# Patient Record
Sex: MALE | Race: WHITE | NOT HISPANIC OR LATINO | Employment: UNEMPLOYED | URBAN - METROPOLITAN AREA
[De-identification: names, ages, dates, MRNs, and addresses within clinical notes are randomized per-mention and may not be internally consistent; named-entity substitution may affect disease eponyms.]

---

## 2017-11-30 ENCOUNTER — TELEPHONE (OUTPATIENT)
Dept: AUDIOLOGY | Facility: CLINIC | Age: 57
End: 2017-11-30

## 2017-11-30 NOTE — TELEPHONE ENCOUNTER
Message left for Alberta to fax over last audiogram to see if patient is CI candidate. If he is we will call to schedule. ----- Message from Akua Koenig sent at 11/27/2017  3:15 PM CST -----  Contact: Alberta Denis office   Alberta is calling to speak with the nurse pt needs to be scheduled for a consult appt for Cochlear implants can you please call Alberta at  [phone]     GIL

## 2017-12-20 ENCOUNTER — PATIENT MESSAGE (OUTPATIENT)
Dept: OTOLARYNGOLOGY | Facility: CLINIC | Age: 57
End: 2017-12-20

## 2018-01-22 ENCOUNTER — CLINICAL SUPPORT (OUTPATIENT)
Dept: AUDIOLOGY | Facility: CLINIC | Age: 58
End: 2018-01-22
Payer: COMMERCIAL

## 2018-01-22 ENCOUNTER — CLINICAL SUPPORT (OUTPATIENT)
Dept: INFECTIOUS DISEASES | Facility: CLINIC | Age: 58
End: 2018-01-22
Payer: COMMERCIAL

## 2018-01-22 ENCOUNTER — TELEPHONE (OUTPATIENT)
Dept: OTOLARYNGOLOGY | Facility: CLINIC | Age: 58
End: 2018-01-22

## 2018-01-22 ENCOUNTER — OFFICE VISIT (OUTPATIENT)
Dept: OTOLARYNGOLOGY | Facility: CLINIC | Age: 58
End: 2018-01-22
Payer: COMMERCIAL

## 2018-01-22 VITALS — BODY MASS INDEX: 35.3 KG/M2 | WEIGHT: 211.88 LBS | HEIGHT: 65 IN

## 2018-01-22 DIAGNOSIS — H90.3 SENSORINEURAL HEARING LOSS (SNHL) OF BOTH EARS: Primary | ICD-10-CM

## 2018-01-22 DIAGNOSIS — Z23 NEED FOR VACCINATION: Primary | ICD-10-CM

## 2018-01-22 DIAGNOSIS — H90.3 SENSORINEURAL HEARING LOSS, BILATERAL: Primary | ICD-10-CM

## 2018-01-22 DIAGNOSIS — Z97.4 DOES USE HEARING AID: ICD-10-CM

## 2018-01-22 PROCEDURE — 90670 PCV13 VACCINE IM: CPT | Mod: S$GLB,,, | Performed by: INTERNAL MEDICINE

## 2018-01-22 PROCEDURE — 99499 UNLISTED E&M SERVICE: CPT | Mod: S$GLB,,, | Performed by: OTOLARYNGOLOGY

## 2018-01-22 PROCEDURE — 99999 PR PBB SHADOW E&M-EST. PATIENT-LVL I: CPT | Mod: PBBFAC,,, | Performed by: AUDIOLOGIST

## 2018-01-22 PROCEDURE — 90471 IMMUNIZATION ADMIN: CPT | Mod: S$GLB,,, | Performed by: INTERNAL MEDICINE

## 2018-01-22 PROCEDURE — 92626 EVAL AUD FUNCJ 1ST HOUR: CPT | Mod: S$GLB,,, | Performed by: AUDIOLOGIST

## 2018-01-22 PROCEDURE — 99204 OFFICE O/P NEW MOD 45 MIN: CPT | Mod: S$GLB,,, | Performed by: OTOLARYNGOLOGY

## 2018-01-22 PROCEDURE — 99999 PR PBB SHADOW E&M-EST. PATIENT-LVL II: CPT | Mod: PBBFAC,,, | Performed by: OTOLARYNGOLOGY

## 2018-01-22 RX ORDER — DULAGLUTIDE 1.5 MG/.5ML
INJECTION, SOLUTION SUBCUTANEOUS
COMMUNITY
Start: 2018-01-13 | End: 2021-07-21

## 2018-01-22 RX ORDER — FLUOXETINE HYDROCHLORIDE 40 MG/1
CAPSULE ORAL
COMMUNITY
Start: 2018-01-03 | End: 2021-07-21

## 2018-01-22 RX ORDER — FINASTERIDE 5 MG/1
5 TABLET, FILM COATED ORAL DAILY
COMMUNITY
Start: 2018-01-03

## 2018-01-22 RX ORDER — PANTOPRAZOLE SODIUM 40 MG/1
TABLET, DELAYED RELEASE ORAL
COMMUNITY
Start: 2018-01-03 | End: 2021-07-21

## 2018-01-22 RX ORDER — LISINOPRIL 20 MG/1
TABLET ORAL
COMMUNITY
Start: 2018-01-03

## 2018-01-22 RX ORDER — ATORVASTATIN CALCIUM 40 MG/1
TABLET, FILM COATED ORAL
COMMUNITY
Start: 2018-01-03 | End: 2021-07-21 | Stop reason: DRUGHIGH

## 2018-01-22 RX ORDER — LIRAGLUTIDE 6 MG/ML
INJECTION SUBCUTANEOUS
Refills: 3 | Status: ON HOLD | COMMUNITY
Start: 2017-10-16 | End: 2018-04-17

## 2018-01-22 NOTE — PROGRESS NOTES
Subjective:       Patient ID: Geoffrey Lama is a 57 y.o. male.    Chief Complaint: No chief complaint on file.    HPI   57 M here for CI eval.  Has h/o hearing loss x 25 yrs AU, symmetric> (R>L?). 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 He has been using HA's for 2-3 years with little relief.     Has severe to profound SNHL AU on audio.    He currently denies any otologic stx including otalgia, otorrhea, vertigo, facial nn weakness.    No h/o ear infections, ear surgery, head trauma.  No FH hearing loss (except grandma at older age), vertigo, migraines.    No imaging to review.    Review of Systems    Review of Systems reviewed including    CONSTITUTIONAL: no fevers, chills, weight loss, night sweats  EYES: no diplopia, no blurry vision  ENT: as above   NEURO: no motor or sensory deficits  CV: no CP, no palpitations  PULM: no cough, no SOB, no wheezing   GI: no abd pain, no constipation/diarrhea  : no dysuria, no hematuria  MSK: no bone/joint pain  HEM: no bruising or bleeding   PSYCH - no depression, no anxiety    History reviewed. No pertinent past medical history.      Current Outpatient Prescriptions:     atorvastatin (LIPITOR) 40 MG tablet, , Disp: , Rfl:     finasteride (PROSCAR) 5 mg tablet, , Disp: , Rfl:     FLUoxetine (PROZAC) 40 MG capsule, , Disp: , Rfl:     lisinopril (PRINIVIL,ZESTRIL) 20 MG tablet, , Disp: , Rfl:     pantoprazole (PROTONIX) 40 MG tablet, , Disp: , Rfl:     TRULICITY 1.5 mg/0.5 mL PnIj, , Disp: , Rfl:     VICTOZA 3-MARGO 0.6 mg/0.1 mL (18 mg/3 mL) PnIj, INJECT 1.8 MG SUBQ EVERY MORNING, Disp: , Rfl: 3    History reviewed. No pertinent surgical history.    Social History   Substance Use Topics    Smoking status: Never Smoker    Smokeless tobacco: Not on file    Alcohol use Not on file       History reviewed. No pertinent family history.    Review of patient's allergies indicates:  No Known Allergies      Objective:      Physical Exam     General: NAD; Well appearing  Neuro: AAOx3. CN II-XII intact.  Cardiovascular: normal rate  Respiratory: No labored breathing, no stridor  Head/Face: Normal inspection  Eyes: EOMI; PERRLA     Ears examined   Right Ear: Auricle WNL. EAC WNL. TM normal.      Left Ear: Auricle WNL. EAC WNL. TM normal.    Nose: normal ext appearance and palpation.  OC/OP: wnl  Neck/Lymphatic: No LAD.              NO IMAGING TO REVIEW    Assessment:       1. severe-profound SNHL AU    2. Does use hearing aid        Plan:       Discussed r/b/a to cochlear implant. Pt would like to proceed.    Patient to have CI evaluation and consideration for right CI. Appropriate imaging, medical and financial clearances to be done. Risks, complications, alternatives and goals discussed and reviewed. Including: infection, bleeding, failure of device, extrusion, dizziness, facial nerve problems and other potential issues.    There is no middle ear infection, the cochlear lumen is suited to implantation and there are no lesions of the auditory nerves or brain.    There are no contraindications to surgery.

## 2018-01-22 NOTE — LETTER
January 22, 2018      Sandrine Guerrero, [facility]  [address]  [facility] [address]           [facility] - Otorhinolaryngology  [address]  [facility] [address]  Phone: [phone]  Fax: [phone]          Patient: Geoffrey Lama   MR Number: [id number]   YOB: 1960   Date of Visit: 1/22/2018       Dear Sandrine Guerrero:    Thank you for referring Geoffrey Lama to me for evaluation. Attached you will find relevant portions of my assessment and plan of care.    If you have questions, please do not hesitate to call me. I look forward to following Geoffrey Lama along with you.    Sincerely,    Brennon Og MD    Enclosure  CC:  No Recipients    If you would like to receive this communication electronically, please contact [email or web address] or [phone] to request more information on [facility] access.    For providers and/or their staff who would like to refer a patient to [facility], please contact us through our one-stop-shop provider referral line, [facility], at [phone].    If you feel you have received this communication in error or would no longer like to receive these types of communications, please e-mail [email or web address]

## 2018-01-22 NOTE — PROGRESS NOTES
COCHLEAR IMPLANT EVALUATION  Geoffrey Lama, 57 y.o., was referred to our cochlear implant team by Dr. Chaparro Og on 1/22/2018 for a formal cochlear implant evaluation.      HISTORY:     reported a long and progressive history of hearing loss in both ears .  The etiology of hearing loss is most likely noise induced.  He has been wearing hearing aids for 25 years.    currently wears Yoanna  in the ear (RITE) hearing aids with custom molds.    AUDIOLOGICAL EVALUATION:  The results of the audiological evaluation indicated a severe to profound sensorineural hearing loss (SNHL) in both ears.  Speech reception thresholds (SRT) were obtained at 70dBHL for both ears.  Speech discrimination scores were 52% for the right ear and 64% for the left ear.     Aided testing was completed in the best aided condition. Bilateral aided results were obtained in the +dBHL range.  Bilateral aided SRT was obtained at 4dBHL.  Aided speech discrimination scores in the soundfield was 72% and 60% for each ear independently.  Mr. Lama scored 11% on the [employer] sentence test in the best aided condition, that is both aids in quiet.  He scored 3% in noise.  He scored 0% in quiet for his left ear and 9% for his right ear.  He scored 3% for CNC words with the right aid.    Tympanometry revealed normal middle ear functioning bilaterally. Acoustic reflexes were absent in all conditions.        RECOMMENDATIONS:  Based on the results of this evaluation,  would benefit from cochlear implantation from an audiological standpoint.  Appropriate expectations, along with the benefits and limitations of the cochlear implant were discussed with  and their family.   acknowledged understanding and indicated he would like to proceed with right cochlear implantation, a Cochlear 512 device, and a gray N7 sound processor.      Katie Mcdowell, CCC-A  Audiologist

## 2018-01-24 ENCOUNTER — TELEPHONE (OUTPATIENT)
Dept: OTOLARYNGOLOGY | Facility: CLINIC | Age: 58
End: 2018-01-24

## 2018-03-16 ENCOUNTER — PATIENT MESSAGE (OUTPATIENT)
Dept: AUDIOLOGY | Facility: CLINIC | Age: 58
End: 2018-03-16

## 2018-03-19 ENCOUNTER — CLINICAL SUPPORT (OUTPATIENT)
Dept: INFECTIOUS DISEASES | Facility: CLINIC | Age: 58
End: 2018-03-19
Payer: COMMERCIAL

## 2018-03-19 ENCOUNTER — HOSPITAL ENCOUNTER (OUTPATIENT)
Dept: RADIOLOGY | Facility: HOSPITAL | Age: 58
Discharge: HOME | End: 2018-03-19
Payer: COMMERCIAL

## 2018-03-19 DIAGNOSIS — H90.3 SENSORINEURAL HEARING LOSS (SNHL) OF BOTH EARS: ICD-10-CM

## 2018-03-19 PROCEDURE — 90732 PPSV23 VACC 2 YRS+ SUBQ/IM: CPT | Mod: S$GLB,,, | Performed by: INTERNAL MEDICINE

## 2018-03-19 PROCEDURE — 90471 IMMUNIZATION ADMIN: CPT | Mod: S$GLB,,, | Performed by: INTERNAL MEDICINE

## 2018-03-19 PROCEDURE — 70480 CT ORBIT/EAR/FOSSA W/O DYE: CPT | Mod: 26,,, | Performed by: RADIOLOGY

## 2018-03-19 PROCEDURE — 70480 CT ORBIT/EAR/FOSSA W/O DYE: CPT | Mod: TC

## 2018-03-23 ENCOUNTER — PATIENT MESSAGE (OUTPATIENT)
Dept: OTOLARYNGOLOGY | Facility: CLINIC | Age: 58
End: 2018-03-23

## 2018-03-23 ENCOUNTER — TELEPHONE (OUTPATIENT)
Dept: OTOLARYNGOLOGY | Facility: CLINIC | Age: 58
End: 2018-03-23

## 2018-03-23 DIAGNOSIS — H90.3 SENSORINEURAL HEARING LOSS (SNHL) OF BOTH EARS: Primary | ICD-10-CM

## 2018-03-23 DIAGNOSIS — Z97.4 DOES USE HEARING AID: ICD-10-CM

## 2018-04-13 ENCOUNTER — PATIENT MESSAGE (OUTPATIENT)
Dept: SURGERY | Facility: HOSPITAL | Age: 58
End: 2018-04-13

## 2018-04-16 ENCOUNTER — TELEPHONE (OUTPATIENT)
Dept: OTOLARYNGOLOGY | Facility: CLINIC | Age: 58
End: 2018-04-16

## 2018-04-17 ENCOUNTER — ANESTHESIA EVENT (OUTPATIENT)
Dept: SURGERY | Facility: HOSPITAL | Age: 58
End: 2018-04-17
Payer: COMMERCIAL

## 2018-04-17 ENCOUNTER — ANESTHESIA (OUTPATIENT)
Dept: SURGERY | Facility: HOSPITAL | Age: 58
End: 2018-04-17
Payer: COMMERCIAL

## 2018-04-17 ENCOUNTER — HOSPITAL ENCOUNTER (OUTPATIENT)
Facility: HOSPITAL | Age: 58
Discharge: HOME | End: 2018-04-17
Attending: OTOLARYNGOLOGY | Admitting: OTOLARYNGOLOGY
Payer: COMMERCIAL

## 2018-04-17 ENCOUNTER — SURGERY (OUTPATIENT)
Age: 58
End: 2018-04-17

## 2018-04-17 DIAGNOSIS — Z01.810 PREOP CARDIOVASCULAR EXAM: ICD-10-CM

## 2018-04-17 DIAGNOSIS — H90.3 SENSORINEURAL HEARING LOSS (SNHL) OF BOTH EARS: ICD-10-CM

## 2018-04-17 DIAGNOSIS — H91.90 HEARING LOSS: Primary | ICD-10-CM

## 2018-04-17 LAB
ANION GAP SERPL CALC-SCNC: 12 MMOL/L
BUN SERPL-MCNC: 15 MG/DL
CALCIUM SERPL-MCNC: 9.5 MG/DL
CHLORIDE SERPL-SCNC: 99 MMOL/L
CO2 SERPL-SCNC: 23 MMOL/L
CREAT SERPL-MCNC: 1 MG/DL
EST. GFR  (AFRICAN AMERICAN): >60 ML/MIN/1.73 M^2
EST. GFR  (NON AFRICAN AMERICAN): >60 ML/MIN/1.73 M^2
GLUCOSE SERPL-MCNC: 224 MG/DL
POCT GLUCOSE: 142 MG/DL (ref 70–110)
POCT GLUCOSE: 230 MG/DL (ref 70–110)
POTASSIUM SERPL-SCNC: 5 MMOL/L
SODIUM SERPL-SCNC: 134 MMOL/L

## 2018-04-17 PROCEDURE — 25000003 PHARM REV CODE 250: Performed by: NURSE ANESTHETIST, CERTIFIED REGISTERED

## 2018-04-17 PROCEDURE — 36000710: Performed by: OTOLARYNGOLOGY

## 2018-04-17 PROCEDURE — 94761 N-INVAS EAR/PLS OXIMETRY MLT: CPT

## 2018-04-17 PROCEDURE — 36000711: Performed by: OTOLARYNGOLOGY

## 2018-04-17 PROCEDURE — 71000015 HC POSTOP RECOV 1ST HR: Performed by: OTOLARYNGOLOGY

## 2018-04-17 PROCEDURE — L8614 COCHLEAR DEVICE: HCPCS | Performed by: OTOLARYNGOLOGY

## 2018-04-17 PROCEDURE — 93010 ELECTROCARDIOGRAM REPORT: CPT | Mod: ,,, | Performed by: INTERNAL MEDICINE

## 2018-04-17 PROCEDURE — D9220A PRA ANESTHESIA: Mod: CRNA,,, | Performed by: NURSE ANESTHETIST, CERTIFIED REGISTERED

## 2018-04-17 PROCEDURE — 69930 IMPLANT COCHLEAR DEVICE: CPT | Mod: RT,,, | Performed by: OTOLARYNGOLOGY

## 2018-04-17 PROCEDURE — 82962 GLUCOSE BLOOD TEST: CPT | Performed by: OTOLARYNGOLOGY

## 2018-04-17 PROCEDURE — 25000003 PHARM REV CODE 250: Performed by: OTOLARYNGOLOGY

## 2018-04-17 PROCEDURE — 37000008 HC ANESTHESIA 1ST 15 MINUTES: Performed by: OTOLARYNGOLOGY

## 2018-04-17 PROCEDURE — 93005 ELECTROCARDIOGRAM TRACING: CPT

## 2018-04-17 PROCEDURE — 63600175 PHARM REV CODE 636 W HCPCS: Performed by: NURSE ANESTHETIST, CERTIFIED REGISTERED

## 2018-04-17 PROCEDURE — 27201423 OPTIME MED/SURG SUP & DEVICES STERILE SUPPLY: Performed by: OTOLARYNGOLOGY

## 2018-04-17 PROCEDURE — 37000009 HC ANESTHESIA EA ADD 15 MINS: Performed by: OTOLARYNGOLOGY

## 2018-04-17 PROCEDURE — 71000033 HC RECOVERY, INTIAL HOUR: Performed by: OTOLARYNGOLOGY

## 2018-04-17 PROCEDURE — 80048 BASIC METABOLIC PNL TOTAL CA: CPT

## 2018-04-17 PROCEDURE — 63600175 PHARM REV CODE 636 W HCPCS: Mod: JG | Performed by: OTOLARYNGOLOGY

## 2018-04-17 PROCEDURE — D9220A PRA ANESTHESIA: Mod: ANES,,, | Performed by: ANESTHESIOLOGY

## 2018-04-17 PROCEDURE — 27000221 HC OXYGEN, UP TO 24 HOURS

## 2018-04-17 DEVICE — IMPLANTABLE DEVICE: Type: IMPLANTABLE DEVICE | Site: EAR | Status: FUNCTIONAL

## 2018-04-17 RX ORDER — METHYLENE BLUE 10 MG/ML
INJECTION INTRAVENOUS
Status: DISCONTINUED | OUTPATIENT
Start: 2018-04-17 | End: 2018-04-17 | Stop reason: HOSPADM

## 2018-04-17 RX ORDER — FENTANYL CITRATE 50 UG/ML
INJECTION, SOLUTION INTRAMUSCULAR; INTRAVENOUS
Status: DISCONTINUED | OUTPATIENT
Start: 2018-04-17 | End: 2018-04-17

## 2018-04-17 RX ORDER — PHENYLEPHRINE HYDROCHLORIDE 10 MG/ML
INJECTION INTRAVENOUS
Status: DISCONTINUED | OUTPATIENT
Start: 2018-04-17 | End: 2018-04-17

## 2018-04-17 RX ORDER — OXYCODONE AND ACETAMINOPHEN 5; 325 MG/1; MG/1
1 TABLET ORAL EVERY 6 HOURS PRN
Qty: 28 TABLET | Refills: 0 | Status: SHIPPED | OUTPATIENT
Start: 2018-04-17 | End: 2021-07-21

## 2018-04-17 RX ORDER — CEPHALEXIN 500 MG/1
500 CAPSULE ORAL EVERY 8 HOURS
Qty: 30 CAPSULE | Refills: 0 | Status: SHIPPED | OUTPATIENT
Start: 2018-04-17 | End: 2018-04-27

## 2018-04-17 RX ORDER — ONDANSETRON 4 MG/1
4 TABLET, ORALLY DISINTEGRATING ORAL EVERY 8 HOURS PRN
Qty: 15 TABLET | Refills: 0 | Status: SHIPPED | OUTPATIENT
Start: 2018-04-17 | End: 2021-07-21

## 2018-04-17 RX ORDER — SODIUM CHLORIDE 9 MG/ML
INJECTION, SOLUTION INTRAVENOUS CONTINUOUS PRN
Status: DISCONTINUED | OUTPATIENT
Start: 2018-04-17 | End: 2018-04-17

## 2018-04-17 RX ORDER — ONDANSETRON 2 MG/ML
INJECTION INTRAMUSCULAR; INTRAVENOUS
Status: DISCONTINUED | OUTPATIENT
Start: 2018-04-17 | End: 2018-04-17

## 2018-04-17 RX ORDER — ROCURONIUM BROMIDE 10 MG/ML
INJECTION, SOLUTION INTRAVENOUS
Status: DISCONTINUED | OUTPATIENT
Start: 2018-04-17 | End: 2018-04-17

## 2018-04-17 RX ORDER — SUCCINYLCHOLINE CHLORIDE 20 MG/ML
INJECTION INTRAMUSCULAR; INTRAVENOUS
Status: DISCONTINUED | OUTPATIENT
Start: 2018-04-17 | End: 2018-04-17

## 2018-04-17 RX ORDER — FENTANYL CITRATE 50 UG/ML
25 INJECTION, SOLUTION INTRAMUSCULAR; INTRAVENOUS EVERY 5 MIN PRN
Status: DISCONTINUED | OUTPATIENT
Start: 2018-04-17 | End: 2018-04-17 | Stop reason: HOSPADM

## 2018-04-17 RX ORDER — PROPOFOL 10 MG/ML
VIAL (ML) INTRAVENOUS
Status: DISCONTINUED | OUTPATIENT
Start: 2018-04-17 | End: 2018-04-17

## 2018-04-17 RX ORDER — MIDAZOLAM HYDROCHLORIDE 1 MG/ML
INJECTION INTRAMUSCULAR; INTRAVENOUS
Status: DISCONTINUED | OUTPATIENT
Start: 2018-04-17 | End: 2018-04-17

## 2018-04-17 RX ORDER — LIDOCAINE HCL/PF 100 MG/5ML
SYRINGE (ML) INTRAVENOUS
Status: DISCONTINUED | OUTPATIENT
Start: 2018-04-17 | End: 2018-04-17

## 2018-04-17 RX ORDER — SODIUM CHLORIDE 0.9 % (FLUSH) 0.9 %
3 SYRINGE (ML) INJECTION
Status: DISCONTINUED | OUTPATIENT
Start: 2018-04-17 | End: 2018-04-17 | Stop reason: HOSPADM

## 2018-04-17 RX ORDER — CEFAZOLIN SODIUM 1 G/3ML
INJECTION, POWDER, FOR SOLUTION INTRAMUSCULAR; INTRAVENOUS
Status: DISCONTINUED | OUTPATIENT
Start: 2018-04-17 | End: 2018-04-17

## 2018-04-17 RX ORDER — METFORMIN HYDROCHLORIDE 500 MG/1
1000 TABLET ORAL 2 TIMES DAILY WITH MEALS
COMMUNITY
End: 2021-07-21 | Stop reason: SDUPTHER

## 2018-04-17 RX ORDER — LIDOCAINE HYDROCHLORIDE AND EPINEPHRINE 15; 5 MG/ML; UG/ML
INJECTION, SOLUTION EPIDURAL
Status: DISCONTINUED | OUTPATIENT
Start: 2018-04-17 | End: 2018-04-17 | Stop reason: HOSPADM

## 2018-04-17 RX ORDER — OXYCODONE AND ACETAMINOPHEN 5; 325 MG/1; MG/1
1 TABLET ORAL EVERY 6 HOURS PRN
Status: DISCONTINUED | OUTPATIENT
Start: 2018-04-17 | End: 2018-04-17 | Stop reason: HOSPADM

## 2018-04-17 RX ADMIN — FENTANYL CITRATE 50 MCG: 50 INJECTION, SOLUTION INTRAMUSCULAR; INTRAVENOUS at 10:04

## 2018-04-17 RX ADMIN — LIDOCAINE HYDROCHLORIDE 100 MG: 20 INJECTION, SOLUTION INTRAVENOUS at 10:04

## 2018-04-17 RX ADMIN — SODIUM CHLORIDE: 0.9 INJECTION, SOLUTION INTRAVENOUS at 10:04

## 2018-04-17 RX ADMIN — PROPOFOL 200 MG: 10 INJECTION, EMULSION INTRAVENOUS at 10:04

## 2018-04-17 RX ADMIN — PHENYLEPHRINE HYDROCHLORIDE 0.25 MCG/KG/MIN: 10 INJECTION INTRAVENOUS at 11:04

## 2018-04-17 RX ADMIN — LIDOCAINE HYDROCHLORIDE AND EPINEPHRINE 10 ML: 15; 5 INJECTION, SOLUTION EPIDURAL at 01:04

## 2018-04-17 RX ADMIN — METHYLENE BLUE 0.5 MG: 10 INJECTION, SOLUTION INTRAVENOUS at 11:04

## 2018-04-17 RX ADMIN — MIDAZOLAM HYDROCHLORIDE 2 MG: 1 INJECTION, SOLUTION INTRAMUSCULAR; INTRAVENOUS at 10:04

## 2018-04-17 RX ADMIN — PHENYLEPHRINE HYDROCHLORIDE 200 MCG: 10 INJECTION INTRAVENOUS at 11:04

## 2018-04-17 RX ADMIN — ONDANSETRON 4 MG: 2 INJECTION INTRAMUSCULAR; INTRAVENOUS at 01:04

## 2018-04-17 RX ADMIN — ROCURONIUM BROMIDE 5 MG: 10 INJECTION, SOLUTION INTRAVENOUS at 10:04

## 2018-04-17 RX ADMIN — SUCCINYLCHOLINE CHLORIDE 140 MG: 20 INJECTION, SOLUTION INTRAMUSCULAR; INTRAVENOUS at 10:04

## 2018-04-17 RX ADMIN — FENTANYL CITRATE 100 MCG: 50 INJECTION, SOLUTION INTRAMUSCULAR; INTRAVENOUS at 10:04

## 2018-04-17 RX ADMIN — SODIUM CHLORIDE, SODIUM GLUCONATE, SODIUM ACETATE, POTASSIUM CHLORIDE, MAGNESIUM CHLORIDE, SODIUM PHOSPHATE, DIBASIC, AND POTASSIUM PHOSPHATE: .53; .5; .37; .037; .03; .012; .00082 INJECTION, SOLUTION INTRAVENOUS at 01:04

## 2018-04-17 RX ADMIN — OXYCODONE HYDROCHLORIDE AND ACETAMINOPHEN 1 TABLET: 5; 325 TABLET ORAL at 02:04

## 2018-04-17 RX ADMIN — CEFAZOLIN 2 G: 330 INJECTION, POWDER, FOR SOLUTION INTRAMUSCULAR; INTRAVENOUS at 11:04

## 2018-04-17 NOTE — TRANSFER OF CARE
"Anesthesia Transfer of Care Note    Patient: Geoffrey Lama    Procedure(s) Performed: Procedure(s) (LRB):  IMPLANTATION-COCHLEAR DEVICE (Right)    Patient location: PACU    Anesthesia Type: general    Transport from OR: Transported from OR on 6-10 L/min O2 by face mask with adequate spontaneous ventilation    Post pain: adequate analgesia    Post assessment: no apparent anesthetic complications    Post vital signs: stable    Level of consciousness: sedated    Nausea/Vomiting: no nausea/vomiting    Complications: none    Transfer of care protocol was followed      Last vitals:   Visit Vitals  /73 (BP Location: Left arm, Patient Position: Lying)   Pulse 97   Temp 36 °C (96.8 °F) (Temporal)   Resp 19   Ht 5' 5" (1.651 m)   Wt 94.3 kg (208 lb)   SpO2 98%   BMI 34.61 kg/m²     "

## 2018-04-17 NOTE — OP NOTE
Pre Op Dx:Sensorineural Hearing Loss    Post Op Dx: Same.    Operative Procedure: Nucleus 24 Channel 512 Cochlear Implantation with use of Operating Microscope and Facial Nerve Monitor. Right    Surgeon: Brennon Og M.D.    Assist:Jasvir    4/17/18    Estimated Blood Loss: 5 cc    Anesthesia: GET.    Operative Procedure in Detail:    The patient was brought to the operating room and placed in the supine position. After general endotracheal anesthesia was induced the patient was prepped and draped in there usual fashion for post auricular cochlear  implant surgery. Using the implant guides the front corner of the implant bed was marked on the lateral musculoperiosteum with an injection of methylene blue.  A C shaped incision was outlined in the postauricular area and infiltrated with 1% xylocaine with 1/100,000 epinephrine solution. The incision was made and carried down to the level of the temporalis muscle.  Hemostasis was obtained. A posteriorly directed subcutaneous flap was created to the level of the methelene blue jesica. Retractors were placed. An anterior based musculoperiosteal flap was incised and elevated to the level of the ear canal. Emissary veins were managed with bone wax and electrocautery. A posterior, superior subperiosteal flap was then elevated for the implant. Using the implant guides a trough for the implant electrodes were created in the lateral temporal bone at the site of the methylene blue jesica to a depth of 5 mm. A posterior, superior ramp was then drilled for the implant antennae. A complete simple mastoidectomy was next done and the facial recess opened using a 1.5 mm kerri blanche. The round window niche was identified.  Musculoperiosteum was harvested for later cochlear packing. Final hemostasis and irrigation was then done. A trough was created underneath the temporalis muscle for the ground electrode. Using micro suction, a 1 mm blanche and soft surgical techniques a cochleostomy was  created into the basal turn of the cochlea at the anterior inferior aspect of the round window niche. The implant was then brought on to the field and placed into the subperiosteal pocket. The active electrode was introduced into the cochleostomy and advanced off stylet until a full insertion had been obtained. The previously harvested fibrous tissue was then used to pack the cochleostomy to affect a seal. The ground electrode was placed in the superior trough and under the temporalis muscle.  The electrodes were then coiled into the mastoid defect. Final hemostasis was obtained with bipolar electrocautery only. Irrigation was done and the wound was closed in layers. A sterile pressure dressing was applied. The patient tolerated the procedure well. There were no intraoperative complications.

## 2018-04-17 NOTE — BRIEF OP NOTE
[facility]  Surgery Department  Operative Note    SUMMARY     Date of Procedure: 4/17/2018     Procedure: Procedure(s) (LRB):  IMPLANTATION-COCHLEAR DEVICE (Right)     Surgeon(s) and Role:     * Sina Tay MD - Resident - Assisting     * Brennon Og MD - Primary        Pre-Operative Diagnosis: Sensorineural hearing loss (SNHL) of both ears [H90.3]  Does use hearing aid [Z97.4]    Post-Operative Diagnosis: Post-Op Diagnosis Codes:     * Sensorineural hearing loss (SNHL) of both ears [H90.3]     * Does use hearing aid [Z97.4]    Anesthesia: General    Technical Procedures Used: see full op note       Complications: No    Estimated Blood Loss (EBL): 40cc           Implants:   Implant Name Type Inv. Item Serial No.  Lot No. LRB No. Used   Nucleus cochlear implant     [id number] COCHLEAR VIOLETTE   Right 1       Specimens:   Specimen (12h ago through future)    None                  Condition: Good    Disposition: PACU - hemodynamically stable.    Attestation: I was present and scrubbed for the entire procedure.       [facility]  Short Stay  Discharge Summary    Admit Date: 4/17/2018    Discharge Date and Time:  04/17/2018 1:25 PM      Discharge Attending Physician: Brennon Og MD     Hospital Course Following completion of an electively scheduled procedure, the patient was transferred to the PACU for postoperative monitoring. The post operative course was uneventful and noted for adequate pain control and PO intake following surgery. The patient is discharged home in good condition and will follow-up with Dr. Brennon Og MD          Final Diagnoses:    Principal Problem: Hearing loss   Secondary Diagnoses:   Active Hospital Problems    Diagnosis  POA    *Hearing loss [H91.90]  Yes      Resolved Hospital Problems    Diagnosis Date Resolved POA   No resolved problems to display.       Discharged Condition: good    Disposition: Home or Self  Care    Follow up/Patient Instructions:    Medications:  Reconciled Home Medications:      Medication List      START taking these medications    cephALEXin 500 MG capsule  Commonly known as:  KEFLEX  Take 1 capsule (500 mg total) by mouth every 8 (eight) hours.     ondansetron 4 MG Tbdl  Commonly known as:  ZOFRAN-ODT  Take 1 tablet (4 mg total) by mouth every 8 (eight) hours as needed.     oxyCODONE-acetaminophen 5-325 mg per tablet  Commonly known as:  PERCOCET  Take 1 tablet by mouth every 6 (six) hours as needed.        CONTINUE taking these medications    atorvastatin 40 MG tablet  Commonly known as:  LIPITOR     finasteride 5 mg tablet  Commonly known as:  PROSCAR     FLUoxetine 40 MG capsule  Commonly known as:  PROZAC     JANUMET  mg per tablet  Generic drug:  SITagliptan-metformin  Take 1 tablet by mouth 2 (two) times daily with meals.     lisinopril 20 MG tablet  Commonly known as:  PRINIVIL,ZESTRIL     metFORMIN 500 MG tablet  Commonly known as:  GLUCOPHAGE  Take 500 mg by mouth 2 (two) times daily with meals.     pantoprazole 40 MG tablet  Commonly known as:  PROTONIX     TRULICITY 1.5 mg/0.5 mL Pnij  Generic drug:  dulaglutide            Discharge Procedure Orders  Diet diabetic     Other restrictions (specify):   Order Comments: No heavy lifting > 10 lbs x 2 weeks. Keep dressing clean and dry.     Leave dressing on - Keep it clean, dry, and intact until clinic visit       Follow-up Information     Brennon Og MD. Schedule an appointment as soon as possible for a visit in 1 day.    Specialty:  Otolaryngology  Why:  For wound re-check  Contact information:  [address]  [facility] [id number] [phone]

## 2018-04-17 NOTE — ANESTHESIA PREPROCEDURE EVALUATION
2018  Geoffrey Lama is a 57 y.o., male.    Anesthesia Evaluation    I have reviewed the Patient Summary Reports.    I have reviewed the Nursing Notes.   I have reviewed the Medications.     Review of Systems  Anesthesia Hx:  No problems with previous Anesthesia  History of prior surgery of interest to airway management or planning: Previous anesthesia: General tonsillectomy as teenager with general anesthesia.  Denies Family Hx of Anesthesia complications.   Denies Personal Hx of Anesthesia complications.   Social:  Non-Smoker    Cardiovascular:   Exercise tolerance: good Hypertension Denies MI.  Denies CAD.    Denies LARA climbing 2 FOS. Father  of MI age 60s.   Pulmonary:  Pulmonary Normal    Renal/:  Renal/ Normal     Hepatic/GI:   GERD, well controlled    Neurological:  Neurology Normal    Endocrine:   Diabetes, type 2        Physical Exam  General:  Well nourished, Obesity    Airway/Jaw/Neck:  Airway Findings: Mouth Opening: Normal Tongue: Normal  General Airway Assessment: Adult  Mallampati: I  TM Distance: Normal, at least 6 cm  Jaw/Neck Findings:  Neck ROM: Normal ROM      Dental:  Dental Findings: In tact   Chest/Lungs:  Chest/Lungs Findings: Normal Respiratory Rate     Heart/Vascular:  Heart Findings: Rate: Normal  Rhythm: Regular Rhythm        Mental Status:  Mental Status Findings:  Cooperative, Alert and Oriented         Anesthesia Plan  Type of Anesthesia, risks & benefits discussed:  Anesthesia Type:  general  Patient's Preference:   Intra-op Monitoring Plan:   Intra-op Monitoring Plan Comments:   Post Op Pain Control Plan:   Post Op Pain Control Plan Comments:   Induction:   IV  Beta Blocker:  Patient is not currently on a Beta-Blocker (No further documentation required).       Informed Consent: Patient understands risks and agrees with Anesthesia plan.  Questions answered.  Anesthesia consent signed with patient.  ASA Score: 2     Day of Surgery Review of History & Physical:    H&P update referred to the surgeon.     Anesthesia Plan Notes: BMP, EKG ordered        Ready For Surgery From Anesthesia Perspective.

## 2018-04-17 NOTE — DISCHARGE INSTRUCTIONS

## 2018-04-18 ENCOUNTER — OFFICE VISIT (OUTPATIENT)
Dept: OTOLARYNGOLOGY | Facility: CLINIC | Age: 58
End: 2018-04-18
Payer: COMMERCIAL

## 2018-04-18 VITALS
OXYGEN SATURATION: 95 % | HEIGHT: 65 IN | WEIGHT: 208 LBS | SYSTOLIC BLOOD PRESSURE: 119 MMHG | RESPIRATION RATE: 18 BRPM | BODY MASS INDEX: 34.66 KG/M2 | DIASTOLIC BLOOD PRESSURE: 71 MMHG | TEMPERATURE: 97 F | HEART RATE: 104 BPM

## 2018-04-18 VITALS — WEIGHT: 212.31 LBS | BODY MASS INDEX: 35.37 KG/M2 | HEIGHT: 65 IN

## 2018-04-18 DIAGNOSIS — Z09 FOLLOW-UP EXAMINATION AFTER EAR SURGERY: Primary | ICD-10-CM

## 2018-04-18 PROCEDURE — 99024 POSTOP FOLLOW-UP VISIT: CPT | Mod: S$GLB,,, | Performed by: OTOLARYNGOLOGY

## 2018-04-18 PROCEDURE — 99999 PR PBB SHADOW E&M-EST. PATIENT-LVL III: CPT | Mod: PBBFAC,,, | Performed by: OTOLARYNGOLOGY

## 2018-04-18 NOTE — PROGRESS NOTES
58 yo M 1 day s/p R Nucleus 24 Channel 512 Cochlear Implantation     Dressing removed. Small seroma expressed. Facial nerve function normal. Packing dry and intact. Routine re check in 1 weeks with appropriate water precautions.

## 2018-04-19 NOTE — ANESTHESIA RELEASE NOTE
"Anesthesia Release from PACU Note    Patient: Geoffrey Lama    Procedure(s) Performed: Procedure(s) (LRB):  IMPLANTATION-COCHLEAR DEVICE (Right)    Anesthesia type: general    Post pain: Adequate analgesia    Post assessment: no apparent anesthetic complications and tolerated procedure well    Last Vitals:   Visit Vitals  /71 (BP Location: Left arm, Patient Position: Lying)   Pulse 104   Temp 36.1 °C (97 °F) (Temporal)   Resp 18   Ht 5' 5" (1.651 m)   Wt 94.3 kg (208 lb)   SpO2 95%   BMI 34.61 kg/m²       Post vital signs: stable    Level of consciousness: awake, alert  and oriented    Nausea/Vomiting: no nausea/no vomiting    Complications: none    Airway Patency: patent    Respiratory: unassisted    Cardiovascular: stable and blood pressure at baseline    Hydration: euvolemic  "

## 2018-04-23 ENCOUNTER — OFFICE VISIT (OUTPATIENT)
Dept: OTOLARYNGOLOGY | Facility: CLINIC | Age: 58
End: 2018-04-23
Payer: COMMERCIAL

## 2018-04-23 VITALS — BODY MASS INDEX: 34.38 KG/M2 | HEIGHT: 65 IN | WEIGHT: 206.38 LBS

## 2018-04-23 DIAGNOSIS — Z09 FOLLOW-UP EXAMINATION AFTER EAR SURGERY: Primary | ICD-10-CM

## 2018-04-23 PROCEDURE — 99999 PR PBB SHADOW E&M-EST. PATIENT-LVL III: CPT | Mod: PBBFAC,,, | Performed by: OTOLARYNGOLOGY

## 2018-04-23 PROCEDURE — 99024 POSTOP FOLLOW-UP VISIT: CPT | Mod: S$GLB,,, | Performed by: OTOLARYNGOLOGY

## 2018-04-23 NOTE — PROGRESS NOTES
1 wk S/P R CI.    Pt doing well post op.  No unusual pain or drainage. No significant vertigo or nausea.    Steri strips removed. Incision healing well. No evidence of hematoma or seroma.    RTC in 3 weeks to see audiology for initial mapping and F/U with me as necessary.

## 2018-04-25 ENCOUNTER — TELEPHONE (OUTPATIENT)
Dept: OTOLARYNGOLOGY | Facility: CLINIC | Age: 58
End: 2018-04-25

## 2018-04-26 ENCOUNTER — TELEPHONE (OUTPATIENT)
Dept: OTOLARYNGOLOGY | Facility: CLINIC | Age: 58
End: 2018-04-26

## 2018-04-26 NOTE — TELEPHONE ENCOUNTER
----- Message from Jeff Hines sent at 4/26/2018  9:35 AM CDT -----  Contact: [phone]  Pt requesting return call from staff when available, please call [phone]

## 2018-04-30 ENCOUNTER — PATIENT MESSAGE (OUTPATIENT)
Dept: AUDIOLOGY | Facility: CLINIC | Age: 58
End: 2018-04-30

## 2018-04-30 ENCOUNTER — PATIENT MESSAGE (OUTPATIENT)
Dept: OTOLARYNGOLOGY | Facility: CLINIC | Age: 58
End: 2018-04-30

## 2018-05-01 ENCOUNTER — TELEPHONE (OUTPATIENT)
Dept: OTOLARYNGOLOGY | Facility: CLINIC | Age: 58
End: 2018-05-01

## 2018-05-08 ENCOUNTER — CLINICAL SUPPORT (OUTPATIENT)
Dept: AUDIOLOGY | Facility: CLINIC | Age: 58
End: 2018-05-08
Payer: COMMERCIAL

## 2018-05-08 DIAGNOSIS — H90.3 SENSORINEURAL HEARING LOSS, BILATERAL: Primary | ICD-10-CM

## 2018-05-08 PROCEDURE — 92603 COCHLEAR IMPLT F/UP EXAM 7/>: CPT | Mod: S$GLB,,, | Performed by: AUDIOLOGIST

## 2018-05-08 NOTE — PROGRESS NOTES
INITIAL STIMULATION   RIGHT COCHLEAR  COCHLEAR    TWO GRAY N7s with #6/#5 gray magnet  MM2  Phone Clip  TV streamer  PLUS ONE OPTION (will most likely want one more rechargeable battery/standard vs compact?  Ask pt on next visit.)    Mr. Lama was seen today for his initial stimulation of his right cochlear implant.  He site looked well healed and he had no post op complaints or concerns.  He is already back to work.      Programming consisted of impedance measures, which were within normal limits and behavioral t level measurements.  Mr. Lama was very reliable letting me know when he heard something.  Going live took place next.  His c levels were raised to a comfortable level.  His dynamic range was 32 units.  He was given three additional programs each with an increase in c levels by 3.  Mr. Lama was encouraged to move through each program weekly.  Once he reached P4, he was informed to stay on P4 until his next CI maryanne't.  Both N7 units were programmed.  His accessories were not paired today.  Instead, he was encouraged to bring them back on his follow up visits.  At this time, he uses a flip phone that has bluetooth connectivity.  It should be able to be paired to his phone clip and streamed to his implant.  He is under the impression that both his Yoanna streamer and his phone clip will be able to stream to both devices.  I am not thinking this will work but will contact Christine for a clearer answer.     [place] one month.

## 2018-05-09 ENCOUNTER — PATIENT MESSAGE (OUTPATIENT)
Dept: AUDIOLOGY | Facility: CLINIC | Age: 58
End: 2018-05-09

## 2018-05-17 ENCOUNTER — PATIENT MESSAGE (OUTPATIENT)
Dept: AUDIOLOGY | Facility: CLINIC | Age: 58
End: 2018-05-17

## 2018-06-08 ENCOUNTER — CLINICAL SUPPORT (OUTPATIENT)
Dept: AUDIOLOGY | Facility: CLINIC | Age: 58
End: 2018-06-08
Payer: COMMERCIAL

## 2018-06-08 DIAGNOSIS — H90.3 SENSORINEURAL HEARING LOSS, BILATERAL: Primary | ICD-10-CM

## 2018-06-08 PROCEDURE — 92604 REPROGRAM COCHLEAR IMPLT 7/>: CPT | Mod: S$GLB,,, | Performed by: AUDIOLOGIST

## 2018-06-08 NOTE — PROGRESS NOTES
"ONE MONTH POST STIM MARYANNE'T  Cochlear- RIGHT EAR  GRAY N7 with #5/#6 gray magnet   (check magnet on next visit; #5 felt fine today  pt did not bring his back up unit nor his wireless accessories  he was instructed to bring for his next visit)  DOS:  4/17/2018  DIS:  5/8/2018  MM2  TVstreamer  PHone Clip (android and flip phone)  Aqua Cover    Mr. Lama was seen today for an audiogram and a fine tuning of his cochlear implant.  He states that he continues to hear the "light saber" noises.  He doesn't hear his blinker in the car, nor his microwave beeping.  He said he could hear some birds though.  He reports wearing his implant as soon as he gets up (around 4 am) and keeps it on until he gets home from work. He was encouraged to continue wearing it at home as he is around more speech.  This will provide him with more access to speech and strengthen his ability to understand better.  His wife stated that they slowed down on doing the listening exercises.  They will try to pick this back up.     Audiogram showed significant improvement in hearing sensitivity.  See below audio.  In addition, he is understanding words in quiet with his implant alone.  His left ear was plugged up with an insert during testing.      Programming consisted of rechecking behavioral t levels and balancing c levels.  NRTs and impedances were performed.  These tests were normal.      Mr. Lama was given two programs today.  P1- new with balanced c levels (slightly louder then P2); P2 - decrease in c levels by 5 for more comfort.      OR 2 months.   Lopez testing, fine tuning, and working with accessories are the goal for this maryanne't.             "

## 2018-10-26 ENCOUNTER — CLINICAL SUPPORT (OUTPATIENT)
Dept: AUDIOLOGY | Facility: CLINIC | Age: 58
End: 2018-10-26
Payer: COMMERCIAL

## 2018-10-26 DIAGNOSIS — H90.3 SENSORINEURAL HEARING LOSS, BILATERAL: Primary | ICD-10-CM

## 2018-10-26 PROCEDURE — 92604 REPROGRAM COCHLEAR IMPLT 7/>: CPT | Mod: S$GLB,,, | Performed by: AUDIOLOGIST

## 2018-10-26 NOTE — PROGRESS NOTES
"SIX MONTH COCHLEAR IMPLANT FOLLOW UP    HL HX:  prog SNHL AU; noise induced; wearing aids for 25 yrs.    Right Ear:  -  Cochlear  Internal Device/Serial Number-  /[id number]  Processor- N7  DOS: 4/17/18  DIS:  5/8/18  Fitting Date-  Processor Color-  Gray  Magnet Strength-  #4 (#5 w/aqua)  Consider taking his #6 back on next visit.  He shouldn't be using this one.   Coil Length-  6 cm  Battery Type-  [facility]  Warranty-  5/18/18    Mr. Lama was seen today for a CIFU visit.   An audiogram was completed and can be viewed below.  Due to pt complaining of "echo" sound, different fine tuning strategies were tried.  First, gains were manupulated, increase and decrease in bass, increase and decrease in treble.  None of these adjustments helped.  Rates were then changed.    P1- 720  P2- 900- No change  P3- 1200    Pt appeared to like all 3 programs.  He was encouraged to experiment with all three.  Whichever one he thought was the clearest, he was told to stay on until his next maryanne't.      His mini chanda and tv streamer were paired.  He is using his android [employer] smart maryanne too.   SC 3 mo.                  "

## 2019-01-23 ENCOUNTER — PATIENT MESSAGE (OUTPATIENT)
Dept: AUDIOLOGY | Facility: CLINIC | Age: 59
End: 2019-01-23

## 2020-02-14 ENCOUNTER — HISTORICAL (OUTPATIENT)
Dept: ADMINISTRATIVE | Facility: HOSPITAL | Age: 60
End: 2020-02-14

## 2020-07-22 ENCOUNTER — TELEPHONE (OUTPATIENT)
Dept: AUDIOLOGY | Facility: CLINIC | Age: 60
End: 2020-07-22

## 2020-07-22 DIAGNOSIS — H90.3 SENSORINEURAL HEARING LOSS, BILATERAL: Primary | ICD-10-CM

## 2021-05-12 ENCOUNTER — PATIENT MESSAGE (OUTPATIENT)
Dept: RESEARCH | Facility: HOSPITAL | Age: 61
End: 2021-05-12

## 2022-04-28 ENCOUNTER — DOCUMENTATION ONLY (OUTPATIENT)
Dept: AUDIOLOGY | Facility: CLINIC | Age: 62
End: 2022-04-28

## 2022-04-28 NOTE — PROGRESS NOTES
Cochlear Implant Registration Form    Right Ear    Cochlear [employer]   Implant Model    Internal Serial Number [id number]   Date of Implantation 04/17/2018   Date of Initial Stimulation 05/08/2018

## 2024-09-23 ENCOUNTER — TELEPHONE (OUTPATIENT)
Dept: AUDIOLOGY | Facility: CLINIC | Age: 64
End: 2024-09-23

## 2024-09-23 NOTE — TELEPHONE ENCOUNTER
Patient's wife called and left a VM with patient information and contact number. Did not state reason for call. Returned call and got VM. Left a VM with the clinic number for the patient's wife to call back should she need assistance.    MAURICE

## 2024-09-24 ENCOUNTER — TELEPHONE (OUTPATIENT)
Dept: AUDIOLOGY | Facility: CLINIC | Age: 64
End: 2024-09-24

## 2024-10-08 ENCOUNTER — CLINICAL SUPPORT (OUTPATIENT)
Dept: AUDIOLOGY | Facility: CLINIC | Age: 64
End: 2024-10-08

## 2024-10-08 DIAGNOSIS — H90.3 SENSORINEURAL HEARING LOSS (SNHL), BILATERAL: Primary | ICD-10-CM

## 2024-10-08 DIAGNOSIS — H93.293 IMPAIRMENT OF AUDITORY DISCRIMINATION OF BOTH EARS: ICD-10-CM

## 2024-10-08 PROCEDURE — 99211 OFF/OP EST MAY X REQ PHY/QHP: CPT | Mod: PBBFAC,25 | Performed by: AUDIOLOGIST

## 2024-10-08 PROCEDURE — 92603 COCHLEAR IMPLT F/UP EXAM 7/>: CPT | Mod: PBBFAC | Performed by: AUDIOLOGIST

## 2024-10-08 PROCEDURE — 99999 PR PBB SHADOW E&M-EST. PATIENT-LVL I: CPT | Mod: PBBFAC,,, | Performed by: AUDIOLOGIST

## 2024-10-09 NOTE — PROGRESS NOTES
40/8/2024    COCHLEAR IMPLANT FOLLOW UP    HL HX:  prog SNHL AU; noise induced; wearing aids for 25 yrs.     Right Ear:  Dr. Og  -  [employer]  Internal Device/Serial Number-  [id number]  Processor- N8    DOS: 4/17/18  DIS:  5/8/18  Fitting Date-  Processor mailed directly to patient  Processor Color-  Grey  Magnet Strength-  #4    Coil Length-  6 cm  Battery Type-  Std [facility]  Warranty-  3 year        Mr. Lama presented to our facility with a replacement sound processor for programming.  Mr. Lama has a history of severe to profound progressive sensorineural hearing loss bilaterally with limited benefit from traditional amplification.  Mr. Lama met all criteria for cochlear implantation and received a cochlear implant in his right ear in 2018.  Mr. Lama continues to wear a [employer] digital hearing in his left ear.  He is completely dependent upon the sound processor for communication.  Mr. Lama stated he lost his N7 sound processor and requested a replacement sound processor from [employer].  He stated his programming audiologist has been Saira Denis at [facility].    Impedance testing was completed.  The sound processor was reset with a new map.  The features of the N8 sound processor were reviewed today.    P1=SCAN 2  P2=SCAN 2 with FF    The processor was linked to his hearing aid and connected to his android phone.  The SIL was working properly today.  Mr. Lama was instructed on the features of the SIL.    Aided testing was completed to confirm aided benefit with the new sound processor.  Aided responses were obtained at 25-30dBHL in sound field with his cochlear implant only.  An aided speech reception threshold was obtained at 35dBHL with 56% speech discrimination score with his implant only.  The replacement sound processor is medically necessary for the safe and effective use of his cochlear implant system.    Recommend:  Follow up programming as  needed.  Cochlear implant supplies as needed.  Annual evaluation.

## 2024-11-21 ENCOUNTER — TELEPHONE (OUTPATIENT)
Dept: AUDIOLOGY | Facility: CLINIC | Age: 64
End: 2024-11-21

## (undated) DEVICE — SUT BONE WAX 2.5 GRMS 12/BX

## (undated) DEVICE — SEE MEDLINE ITEM 152622

## (undated) DEVICE — SOL IRR NACL .9% 3000ML

## (undated) DEVICE — SYR SLIP TIP 1CC

## (undated) DEVICE — SOL IRR WATER STRL 3000 ML

## (undated) DEVICE — BANDAGE KERLIX P/P 2.25IN STER

## (undated) DEVICE — BIT DRILL TUBING

## (undated) DEVICE — CLOSURE SKIN STERI STRIP 1/2X4

## (undated) DEVICE — BURR CUTT CARB 3X38 E9000 FLUT

## (undated) DEVICE — SUCTION SURGICAL FRAZR

## (undated) DEVICE — FORCEP CURVED DISP

## (undated) DEVICE — ELECTRODE REM PLYHSV RETURN 9

## (undated) DEVICE — CLIPPER BLADE MOD 4406 (CAREF)

## (undated) DEVICE — GAUZE FLUFF XXLG 36X36 2 PLY

## (undated) DEVICE — CORD BIPOLAR 12 FOOT

## (undated) DEVICE — GAUGE FLUFF X-SUPER 36X36 2PLY

## (undated) DEVICE — Device

## (undated) DEVICE — SUT 3/0 18IN COATED VICRYLP

## (undated) DEVICE — SPONGE GAUZE 16PLY 4X4

## (undated) DEVICE — BURR LSO ROUND FLUTED 5MM

## (undated) DEVICE — ELECTRODE NDL

## (undated) DEVICE — DRESSING TELFA STRL 4X3 LF

## (undated) DEVICE — COVERS PROBE NR-48 STERILE

## (undated) DEVICE — KIT SUCTION IRRIGATION

## (undated) DEVICE — BURR DIAMOND FINE 1.0X48

## (undated) DEVICE — BURR ROUND DIAMOND 1.5X48MM

## (undated) DEVICE — SEE MEDLINE ITEM 157128

## (undated) DEVICE — KIT EAR EAOFE